# Patient Record
Sex: FEMALE | Race: WHITE | NOT HISPANIC OR LATINO | Employment: PART TIME | URBAN - NONMETROPOLITAN AREA
[De-identification: names, ages, dates, MRNs, and addresses within clinical notes are randomized per-mention and may not be internally consistent; named-entity substitution may affect disease eponyms.]

---
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
## 2020-08-28 ENCOUNTER — OFFICE VISIT (OUTPATIENT)
Dept: FAMILY MEDICINE CLINIC | Facility: CLINIC | Age: 20
End: 2020-08-28

## 2020-08-28 ENCOUNTER — LAB (OUTPATIENT)
Dept: LAB | Facility: HOSPITAL | Age: 20
End: 2020-08-28

## 2020-08-28 VITALS
DIASTOLIC BLOOD PRESSURE: 72 MMHG | WEIGHT: 137.6 LBS | SYSTOLIC BLOOD PRESSURE: 120 MMHG | HEIGHT: 64 IN | BODY MASS INDEX: 23.49 KG/M2 | OXYGEN SATURATION: 97 % | HEART RATE: 106 BPM

## 2020-08-28 DIAGNOSIS — Z13.29 SCREENING FOR THYROID DISORDER: ICD-10-CM

## 2020-08-28 DIAGNOSIS — Z76.89 ENCOUNTER TO ESTABLISH CARE: Primary | ICD-10-CM

## 2020-08-28 DIAGNOSIS — E55.9 VITAMIN D DEFICIENCY: ICD-10-CM

## 2020-08-28 DIAGNOSIS — Z13.1 SCREENING FOR DIABETES MELLITUS: ICD-10-CM

## 2020-08-28 DIAGNOSIS — F41.9 ANXIETY: ICD-10-CM

## 2020-08-28 DIAGNOSIS — Z11.59 ENCOUNTER FOR HEPATITIS C SCREENING TEST FOR LOW RISK PATIENT: ICD-10-CM

## 2020-08-28 DIAGNOSIS — F33.1 MODERATE EPISODE OF RECURRENT MAJOR DEPRESSIVE DISORDER (HCC): ICD-10-CM

## 2020-08-28 DIAGNOSIS — Z13.0 SCREENING FOR DEFICIENCY ANEMIA: ICD-10-CM

## 2020-08-28 LAB
ALBUMIN SERPL-MCNC: 4.6 G/DL (ref 3.5–5.2)
ALBUMIN/GLOB SERPL: 1.6 G/DL
ALP SERPL-CCNC: 40 U/L (ref 39–117)
ALT SERPL W P-5'-P-CCNC: 21 U/L (ref 1–33)
ANION GAP SERPL CALCULATED.3IONS-SCNC: 11.1 MMOL/L (ref 5–15)
AST SERPL-CCNC: 17 U/L (ref 1–32)
BASOPHILS # BLD AUTO: 0.02 10*3/MM3 (ref 0–0.2)
BASOPHILS NFR BLD AUTO: 0.4 % (ref 0–1.5)
BILIRUB SERPL-MCNC: 0.3 MG/DL (ref 0–1.2)
BUN SERPL-MCNC: 6 MG/DL (ref 6–20)
BUN/CREAT SERPL: 6.7 (ref 7–25)
CALCIUM SPEC-SCNC: 9.8 MG/DL (ref 8.6–10.5)
CHLORIDE SERPL-SCNC: 104 MMOL/L (ref 98–107)
CO2 SERPL-SCNC: 22.9 MMOL/L (ref 22–29)
CREAT SERPL-MCNC: 0.89 MG/DL (ref 0.57–1)
DEPRECATED RDW RBC AUTO: 38.7 FL (ref 37–54)
EOSINOPHIL # BLD AUTO: 0.04 10*3/MM3 (ref 0–0.4)
EOSINOPHIL NFR BLD AUTO: 0.7 % (ref 0.3–6.2)
ERYTHROCYTE [DISTWIDTH] IN BLOOD BY AUTOMATED COUNT: 12.7 % (ref 12.3–15.4)
GFR SERPL CREATININE-BSD FRML MDRD: 81 ML/MIN/1.73
GLOBULIN UR ELPH-MCNC: 2.8 GM/DL
GLUCOSE SERPL-MCNC: 80 MG/DL (ref 65–99)
HCT VFR BLD AUTO: 42.9 % (ref 34–46.6)
HGB BLD-MCNC: 14.4 G/DL (ref 12–15.9)
IMM GRANULOCYTES # BLD AUTO: 0.02 10*3/MM3 (ref 0–0.05)
IMM GRANULOCYTES NFR BLD AUTO: 0.4 % (ref 0–0.5)
LYMPHOCYTES # BLD AUTO: 1.73 10*3/MM3 (ref 0.7–3.1)
LYMPHOCYTES NFR BLD AUTO: 30.5 % (ref 19.6–45.3)
MCH RBC QN AUTO: 28.1 PG (ref 26.6–33)
MCHC RBC AUTO-ENTMCNC: 33.6 G/DL (ref 31.5–35.7)
MCV RBC AUTO: 83.8 FL (ref 79–97)
MONOCYTES # BLD AUTO: 0.37 10*3/MM3 (ref 0.1–0.9)
MONOCYTES NFR BLD AUTO: 6.5 % (ref 5–12)
NEUTROPHILS NFR BLD AUTO: 3.5 10*3/MM3 (ref 1.7–7)
NEUTROPHILS NFR BLD AUTO: 61.5 % (ref 42.7–76)
NRBC BLD AUTO-RTO: 0 /100 WBC (ref 0–0.2)
PLATELET # BLD AUTO: 290 10*3/MM3 (ref 140–450)
PMV BLD AUTO: 10.1 FL (ref 6–12)
POTASSIUM SERPL-SCNC: 3.8 MMOL/L (ref 3.5–5.2)
PROT SERPL-MCNC: 7.4 G/DL (ref 6–8.5)
RBC # BLD AUTO: 5.12 10*6/MM3 (ref 3.77–5.28)
SODIUM SERPL-SCNC: 138 MMOL/L (ref 136–145)
TSH SERPL DL<=0.05 MIU/L-ACNC: 1.28 UIU/ML (ref 0.27–4.2)
WBC # BLD AUTO: 5.68 10*3/MM3 (ref 3.4–10.8)

## 2020-08-28 PROCEDURE — 80053 COMPREHEN METABOLIC PANEL: CPT

## 2020-08-28 PROCEDURE — 99203 OFFICE O/P NEW LOW 30 MIN: CPT | Performed by: PHYSICIAN ASSISTANT

## 2020-08-28 PROCEDURE — 85025 COMPLETE CBC W/AUTO DIFF WBC: CPT

## 2020-08-28 PROCEDURE — 84443 ASSAY THYROID STIM HORMONE: CPT

## 2020-08-28 PROCEDURE — 36415 COLL VENOUS BLD VENIPUNCTURE: CPT

## 2020-08-28 PROCEDURE — 86803 HEPATITIS C AB TEST: CPT

## 2020-08-28 PROCEDURE — 82306 VITAMIN D 25 HYDROXY: CPT

## 2020-08-28 RX ORDER — DROSPIRENONE AND ETHINYL ESTRADIOL 0.02-3(28)
1 KIT ORAL DAILY
COMMUNITY
End: 2023-03-14

## 2020-08-28 RX ORDER — BUPROPION HYDROCHLORIDE 150 MG/1
150 TABLET ORAL EVERY MORNING
Qty: 30 TABLET | Refills: 1 | Status: SHIPPED | OUTPATIENT
Start: 2020-08-28 | End: 2023-03-14

## 2020-08-28 NOTE — PROGRESS NOTES
Chief Complaint   Patient presents with   • Establish Care   • Anxiety     was on medicine for about 4 years, came off medications about 8 months ago. States that it was okay for a couple of months, and now having more issues. Stress and anxiety has worsened a couple of months ago and started having pain. Pt states that she has lost some weight as well. Pt reports that she formerly smoked marijuana and recently stopped doing that as well   • Abdominal Pain     been going on for a couple of months       HPI     Nataliia Rick is a 20 y.o. female who presents to establish care.  Patient has recently been seen by the [facility] in [place].  She reports that this office is closer and prefers to transfer her care here.  She has a long history of depression and anxiety starting in her teens.  She started medication when she was 14 years old and recently discontinued it 8 months ago.  She wanted to see if she really needed the medication.  She reports that she has had worsening depression and anxiety since discontinuing the medication.  She has noticed pain throughout her body as well including her abdominal area.  She was smoking marijuana to help with her anxiety but discontinued this a few weeks ago as it started to make her symptoms worse.  She has noticed weight loss since discontinuing the marijuana.  She has tried Zoloft but did not tolerate it.  Her main complaints of side effects are weight gain and and anorgasmia.  Cymbalta is listed as a medication but patient does not remember ever taking this.  She was given hydroxyzine and she does not feel that this was helpful.  She has never taken bupropion.  She is currently on this and Spironolactone 75 mg daily for acne which her dermatologist prescribes.  She is open to seeing psychiatry.    Chief Complaint   Patient presents with   • Establish Care   • Anxiety     was on medicine for about 4 years, came off medications about 8 months ago. States that it was okay for a  "couple of months, and now having more issues. Stress and anxiety has worsened a couple of months ago and started having pain. Pt states that she has lost some weight as well. Pt reports that she formerly smoked marijuana and recently stopped doing that as well   • Abdominal Pain     been going on for a couple of months       Past Medical History:   Diagnosis Date   • Anxiety    • Depression        Past Surgical History:   Procedure Laterality Date   • ARM LACERATION REPAIR     • TONSILLECTOMY         Family History   Problem Relation Age of Onset   • Diabetes Mother    • Depression Mother        Social History     Socioeconomic History   • Marital status: Unknown     Spouse name: Not on file   • Number of children: Not on file   • Years of education: Not on file   • Highest education level: Not on file   Tobacco Use   • Smoking status: Never Smoker   • Smokeless tobacco: Never Used   Substance and Sexual Activity   • Alcohol use: No     Frequency: Never   • Drug use: Yes     Types: Marijuana     Comment: stopped using   • Sexual activity: Defer       Allergies   Allergen Reactions   • Sulfa Antibiotics Itching       ROS    Review of Systems   Constitutional: Positive for fatigue and unexpected weight loss. Negative for chills, diaphoresis and fever.   HENT: Negative for congestion, postnasal drip and rhinorrhea.    Respiratory: Negative for cough, shortness of breath and wheezing.    Cardiovascular: Negative for chest pain and leg swelling.   Musculoskeletal: Positive for arthralgias and myalgias.   Neurological: Negative for dizziness and headache.   Psychiatric/Behavioral: Positive for agitation, sleep disturbance, depressed mood and stress. Negative for self-injury and suicidal ideas. The patient is nervous/anxious.        Vitals:    08/28/20 1207   BP: 120/72   BP Location: Left arm   Patient Position: Sitting   Cuff Size: Adult   Pulse: 106   SpO2: 97%   Weight: 62.4 kg (137 lb 9.6 oz)   Height: 162.6 cm (64\") "     Body mass index is 23.62 kg/m².    Current Outpatient Medications on File Prior to Visit   Medication Sig Dispense Refill   • drospirenone-ethinyl estradiol (KATHY,GIANVI) 3-0.02 MG per tablet Take 1 tablet by mouth Daily.     • spironolactone (ALDACTONE) 25 MG tablet Take 25 mg by mouth Daily. She reports that she takes 3 25 mg tablets daily     • [DISCONTINUED] dicyclomine (BENTYL) 10 MG capsule Take 1 capsule by mouth 3 (Three) Times a Day As Needed (abdominal discomfort). 40 capsule 0   • [DISCONTINUED] DULoxetine HCl (CYMBALTA PO) Take  by mouth.     • [DISCONTINUED] hydrOXYzine (ATARAX) 10 MG tablet Take 10 mg by mouth 3 (Three) Times a Day As Needed for Itching.       No current facility-administered medications on file prior to visit.        No results found for this or any previous visit.    PE  Physical Exam   Constitutional: Vital signs are normal. She appears well-developed and well-nourished. She is active and cooperative. She does not appear ill. No distress. She appears overweight. She is not obese.  HENT:   Head: Normocephalic and atraumatic.   Eyes: EOM are normal.   Neck: Normal range of motion.   Cardiovascular: Normal rate, regular rhythm and normal heart sounds.   Pulmonary/Chest: Effort normal and breath sounds normal.   Musculoskeletal: Normal range of motion. She exhibits no edema.   Neurological: She is alert.   Skin: Skin is warm. She is not diaphoretic. No erythema.   Psychiatric: Her speech is normal. Judgment and thought content normal. Her mood appears anxious. She is agitated. She is not actively hallucinating. Cognition and memory are normal. She exhibits a depressed mood.   Tearful during appointment. She is attentive.   Vitals reviewed.      A/P    Nataliia was seen today for establish care, anxiety and abdominal pain.    Diagnoses and all orders for this visit:    Moderate episode of recurrent major depressive disorder (CMS/HCC)  -     Ambulatory Referral to Psychiatry  -      buPROPion XL (Wellbutrin XL) 150 MG 24 hr tablet; Take 1 tablet by mouth Every Morning.    Anxiety  -     Ambulatory Referral to Psychiatry  -     buPROPion XL (Wellbutrin XL) 150 MG 24 hr tablet; Take 1 tablet by mouth Every Morning.    Vitamin D deficiency  -     Vitamin D 25 Hydroxy; Future    Screening for deficiency anemia  -     CBC Auto Differential; Future    Screening for thyroid disorder  -     TSH Rfx On Abnormal To Free T4; Future    Screening for diabetes mellitus  -     Comprehensive Metabolic Panel; Future    Encounter for hepatitis C screening test for low risk patient  -     Hepatitis C Antibody; Future         Plan of care reviewed with patient at the conclusion of today's visit. Education was provided regarding diagnosis, management and any prescribed or recommended OTC medications.  Patient verbalizes understanding of and agreement with management plan.    Return in about 4 weeks (around 9/25/2020) for Annual physical.     Adeola Melton PA-C

## 2020-08-28 NOTE — PATIENT INSTRUCTIONS
Regular sleep schedule, 10 or 11 pm to 7 or 8 am.  Meditation - Headspace.  Breathing techniques on youtube.  Yoga and exercise (walking), 30 minutes a day.                Living With Depression  Everyone experiences occasional disappointment, sadness, and loss in their lives. When you are feeling down, blue, or sad for at least 2 weeks in a row, it may mean that you have depression. Depression can affect your thoughts and feelings, relationships, daily activities, and physical health. It is caused by changes in the way your brain functions. If you receive a diagnosis of depression, your health care provider will tell you which type of depression you have and what treatment options are available to you.  If you are living with depression, there are ways to help you recover from it and also ways to prevent it from coming back.  How to cope with lifestyle changes  Coping with stress         Stress is your body’s reaction to life changes and events, both good and bad. Stressful situations may include:  · Getting .  · The death of a spouse.  · Losing a job.  · Retiring.  · Having a baby.  Stress can last just a few hours or it can be ongoing. Stress can play a major role in depression, so it is important to learn both how to cope with stress and how to think about it differently.  Talk with your health care provider or a counselor if you would like to learn more about stress reduction. He or she may suggest some stress reduction techniques, such as:  · Music therapy. This can include creating music or listening to music. Choose music that you enjoy and that inspires you.  · Mindfulness-based meditation. This kind of meditation can be done while sitting or walking. It involves being aware of your normal breaths, rather than trying to control your breathing.  · Centering prayer. This is a kind of meditation that involves focusing on a spiritual word or phrase. Choose a word, phrase, or sacred image that is  meaningful to you and that brings you peace.  · Deep breathing. To do this, expand your stomach and inhale slowly through your nose. Hold your breath for 3-5 seconds, then exhale slowly, allowing your stomach muscles to relax.  · Muscle relaxation. This involves intentionally tensing muscles then relaxing them.  Choose a stress reduction technique that fits your lifestyle and personality. Stress reduction techniques take time and practice to develop. Set aside 5-15 minutes a day to do them. Therapists can offer training in these techniques. The training may be covered by some insurance plans. Other things you can do to manage stress include:  · Keeping a stress diary. This can help you learn what triggers your stress and ways to control your response.  · Understanding what your limits are and saying no to requests or events that lead to a schedule that is too full.  · Thinking about how you respond to certain situations. You may not be able to control everything, but you can control how you react.  · Adding humor to your life by watching funny films or TV shows.  · Making time for activities that help you relax and not feeling guilty about spending your time this way.    Medicines  Your health care provider may suggest certain medicines if he or she feels that they will help improve your condition. Avoid using alcohol and other substances that may prevent your medicines from working properly (may interact). It is also important to:  · Talk with your pharmacist or health care provider about all the medicines that you take, their possible side effects, and what medicines are safe to take together.  · Make it your goal to take part in all treatment decisions (shared decision-making). This includes giving input on the side effects of medicines. It is best if shared decision-making with your health care provider is part of your total treatment plan.  If your health care provider prescribes a medicine, you may not notice  the full benefits of it for 4-8 weeks. Most people who are treated for depression need to be on medicine for at least 6-12 months after they feel better. If you are taking medicines as part of your treatment, do not stop taking medicines without first talking to your health care provider. You may need to have the medicine slowly decreased (tapered) over time to decrease the risk of harmful side effects.  Relationships  Your health care provider may suggest family therapy along with individual therapy and drug therapy. While there may not be family problems that are causing you to feel depressed, it is still important to make sure your family learns as much as they can about your mental health. Having your family’s support can help make your treatment successful.  How to recognize changes in your condition  Everyone has a different response to treatment for depression. Recovery from major depression happens when you have not had signs of major depression for two months. This may mean that you will start to:  · Have more interest in doing activities.  · Feel less hopeless than you did 2 months ago.  · Have more energy.  · Overeat less often, or have better or improving appetite.  · Have better concentration.  Your health care provider will work with you to decide the next steps in your recovery. It is also important to recognize when your condition is getting worse. Watch for these signs:  · Having fatigue or low energy.  · Eating too much or too little.  · Sleeping too much or too little.  · Feeling restless, agitated, or hopeless.  · Having trouble concentrating or making decisions.  · Having unexplained physical complaints.  · Feeling irritable, angry, or aggressive.  Get help as soon as you or your family members notice these symptoms coming back.  How to get support and help from others  How to talk with friends and family members about your condition    Talking to friends and family members about your condition  can provide you with one way to get support and guidance. Reach out to trusted friends or family members, explain your symptoms to them, and let them know that you are working with a health care provider to treat your depression.  Financial resources  Not all insurance plans cover mental health care, so it is important to check with your insurance carrier. If paying for co-pays or counseling services is a problem, search for a local or [facility] mental health care center. They may be able to offer public mental health care services at low or no cost when you are not able to see a private health care provider.  If you are taking medicine for depression, you may be able to get the generic form, which may be less expensive. Some makers of prescription medicines also offer help to patients who cannot afford the medicines they need.  Follow these instructions at home:    · Get the right amount and quality of sleep.  · Cut down on using caffeine, tobacco, alcohol, and other potentially harmful substances.  · Try to exercise, such as walking or lifting small weights.  · Take over-the-counter and prescription medicines only as told by your health care provider.  · Eat a healthy diet that includes plenty of vegetables, fruits, whole grains, low-fat dairy products, and lean protein. Do not eat a lot of foods that are high in solid fats, added sugars, or salt.  · Keep all follow-up visits as told by your health care provider. This is important.  Contact a health care provider if:  · You stop taking your antidepressant medicines, and you have any of these symptoms:  ? Nausea.  ? Headache.  ? Feeling lightheaded.  ? Chills and body aches.  ? Not being able to sleep (insomnia).  · You or your friends and family think your depression is getting worse.  Get help right away if:  · You have thoughts of hurting yourself or others.  If you ever feel like you may hurt yourself or others, or have thoughts about taking your own life, get  help right away. You can go to your nearest emergency department or call:  · Your local emergency services (911 in the [place]  · A suicide crisis helpline, such as the National Suicide Prevention Lifeline at 1-479.911.7114. This is open 24-hours a day.  Summary  · If you are living with depression, there are ways to help you recover from it and also ways to prevent it from coming back.  · Work with your health care team to create a management plan that includes counseling, stress management techniques, and healthy lifestyle habits.  This information is not intended to replace advice given to you by your health care provider. Make sure you discuss any questions you have with your health care provider.  Document Released: 11/20/2017 Document Revised: 04/10/2020 Document Reviewed: 11/20/2017  Elsevier Patient Education © 2020 [employer]

## 2020-08-29 LAB
25(OH)D3 SERPL-MCNC: 44.4 NG/ML (ref 30–100)
HCV AB SER DONR QL: NORMAL

## 2020-09-01 ENCOUNTER — TELEPHONE (OUTPATIENT)
Dept: FAMILY MEDICINE CLINIC | Facility: CLINIC | Age: 20
End: 2020-09-01

## 2020-09-01 NOTE — TELEPHONE ENCOUNTER
Patient states that she had labs done last week and was wanting to get the results.  She can be reached at [phone]

## 2020-09-21 ENCOUNTER — OFFICE VISIT (OUTPATIENT)
Dept: FAMILY MEDICINE CLINIC | Facility: CLINIC | Age: 20
End: 2020-09-21

## 2020-09-21 VITALS
BODY MASS INDEX: 23.9 KG/M2 | HEART RATE: 80 BPM | DIASTOLIC BLOOD PRESSURE: 74 MMHG | HEIGHT: 64 IN | SYSTOLIC BLOOD PRESSURE: 106 MMHG | OXYGEN SATURATION: 98 % | WEIGHT: 140 LBS

## 2020-09-21 DIAGNOSIS — L71.0 PERIORAL DERMATITIS: Primary | ICD-10-CM

## 2020-09-21 PROCEDURE — 99213 OFFICE O/P EST LOW 20 MIN: CPT | Performed by: PHYSICIAN ASSISTANT

## 2020-09-21 RX ORDER — SPIRONOLACTONE 50 MG/1
TABLET, FILM COATED ORAL
COMMUNITY
End: 2023-03-14

## 2020-09-21 NOTE — PROGRESS NOTES
"    Chief Complaint   Patient presents with   • Rash     rash around corners on mouth noticed yesterday. did last month had some oral thursh at one point       HPI     Nataliia Rick is a pleasant 20 y.o. female who presents for evaluation of \"chief complaint.\" She c/o of new onset rash at the corners of her mouth for 1 day. It has improved some today. She thinks there is only some slight dryness now.     She was treated for thrush last month and wants to make sure it is gone. She denies current symptoms.     Past Medical History:   Diagnosis Date   • Anxiety    • Depression        Past Surgical History:   Procedure Laterality Date   • ARM LACERATION REPAIR     • TONSILLECTOMY         Family History   Problem Relation Age of Onset   • Diabetes Mother    • Depression Mother        Social History     Socioeconomic History   • Marital status: Unknown     Spouse name: Not on file   • Number of children: Not on file   • Years of education: Not on file   • Highest education level: Not on file   Tobacco Use   • Smoking status: Never Smoker   • Smokeless tobacco: Never Used   Substance and Sexual Activity   • Alcohol use: No     Frequency: Never   • Drug use: Yes     Types: Marijuana     Comment: stopped using   • Sexual activity: Defer       Allergies   Allergen Reactions   • Sulfa Antibiotics Itching   • Benzonatate Rash       ROS    Review of Systems   Skin: Positive for rash.       Vitals:    09/21/20 1229   BP: 106/74   Pulse: 80   SpO2: 98%     Body mass index is 24.03 kg/m².      Current Outpatient Medications:   •  drospirenone-ethinyl estradiol (KATHY,GIANVI) 3-0.02 MG per tablet, Take 1 tablet by mouth Daily., Disp: , Rfl:   •  spironolactone (ALDACTONE) 25 MG tablet, Take 25 mg by mouth Daily. She reports that she takes 3 25 mg tablets daily, Disp: , Rfl:   •  buPROPion XL (Wellbutrin XL) 150 MG 24 hr tablet, Take 1 tablet by mouth Every Morning., Disp: 30 tablet, Rfl: 1  •  spironolactone (ALDACTONE) 50 MG tablet, " spironolactone 50 mg tablet, Disp: , Rfl:     PE    Physical Exam  Constitutional:       General: She is not in acute distress.  HENT:      Mouth/Throat:      Comments: Geographic tongue. No residual thrush  Pulmonary:      Effort: Pulmonary effort is normal. No respiratory distress.   Skin:     Comments: No perioral rash present on exam today   Neurological:      Mental Status: She is alert.   Psychiatric:         Mood and Affect: Mood normal.          A/P    Problem List Items Addressed This Visit     None      Visit Diagnoses     Perioral dermatitis    -  Primary  -None by exam, improving per patient  -Discussed monitoring since I do not appreciate any dry skin or other abnormalities on her exam. If the rash returns, we could try topical mupirocin. Encouraged patient to call or return if needed          Plan of care was reviewed with patient at the conclusion of today's visit. Education was provided regarding diagnoses, management, prescribed or recommended OTC products, and the importance of compliance with follow-up appointments. The patient was counseled regarding the risks, benefits, and possible side-effects of treatment. I advised the patient to keep me informed of any acute changes in their status including new, worsening, or persistent symptoms. Patient expresses understanding and agreement with the management plan.        KATTY Gonzalez

## 2020-09-21 NOTE — PROGRESS NOTES
I have reviewed the notes, assessments, and/or procedures performed by KATTY Ngo, I concur with her/his documentation of Nataliia Rick.

## 2021-05-28 ENCOUNTER — HOSPITAL ENCOUNTER (EMERGENCY)
Facility: HOSPITAL | Age: 21
Discharge: HOME | End: 2021-05-28
Attending: EMERGENCY MEDICINE | Admitting: EMERGENCY MEDICINE

## 2021-05-28 VITALS
TEMPERATURE: 97.7 F | DIASTOLIC BLOOD PRESSURE: 76 MMHG | SYSTOLIC BLOOD PRESSURE: 116 MMHG | HEART RATE: 97 BPM | RESPIRATION RATE: 18 BRPM | OXYGEN SATURATION: 98 % | WEIGHT: 155 LBS | HEIGHT: 64 IN | BODY MASS INDEX: 26.46 KG/M2

## 2021-05-28 DIAGNOSIS — Z91.89 AT RISK FOR SEXUALLY TRANSMITTED DISEASE DUE TO PARTNER WITH MULTIPLE PARTNERS: ICD-10-CM

## 2021-05-28 DIAGNOSIS — Z72.51 UNPROTECTED SEXUAL INTERCOURSE: Primary | ICD-10-CM

## 2021-05-28 DIAGNOSIS — F41.9 ANXIETY: ICD-10-CM

## 2021-05-28 PROCEDURE — 99282 EMERGENCY DEPT VISIT SF MDM: CPT

## 2021-06-23 ENCOUNTER — LAB (OUTPATIENT)
Dept: LAB | Facility: HOSPITAL | Age: 21
End: 2021-06-23

## 2021-06-23 ENCOUNTER — TRANSCRIBE ORDERS (OUTPATIENT)
Dept: LAB | Facility: HOSPITAL | Age: 21
End: 2021-06-23

## 2021-06-23 DIAGNOSIS — Z11.3 SCREENING EXAMINATION FOR VENEREAL DISEASE: ICD-10-CM

## 2021-06-23 DIAGNOSIS — Z11.3 SCREENING EXAMINATION FOR VENEREAL DISEASE: Primary | ICD-10-CM

## 2021-06-23 PROCEDURE — 87340 HEPATITIS B SURFACE AG IA: CPT

## 2021-06-23 PROCEDURE — 86592 SYPHILIS TEST NON-TREP QUAL: CPT

## 2021-06-23 PROCEDURE — G0432 EIA HIV-1/HIV-2 SCREEN: HCPCS

## 2021-06-23 PROCEDURE — 86803 HEPATITIS C AB TEST: CPT

## 2021-06-23 PROCEDURE — 36415 COLL VENOUS BLD VENIPUNCTURE: CPT

## 2021-06-24 LAB
HBV SURFACE AG SERPL QL IA: NORMAL
HCV AB SER DONR QL: NORMAL
HIV1+2 AB SER QL: NORMAL
RPR SER QL: NORMAL

## 2023-03-14 ENCOUNTER — OFFICE VISIT (OUTPATIENT)
Dept: OBSTETRICS AND GYNECOLOGY | Facility: CLINIC | Age: 23
End: 2023-03-14
Payer: COMMERCIAL

## 2023-03-14 VITALS
HEIGHT: 64 IN | BODY MASS INDEX: 19.7 KG/M2 | SYSTOLIC BLOOD PRESSURE: 98 MMHG | DIASTOLIC BLOOD PRESSURE: 66 MMHG | WEIGHT: 115.4 LBS

## 2023-03-14 DIAGNOSIS — N76.0 BV (BACTERIAL VAGINOSIS): ICD-10-CM

## 2023-03-14 DIAGNOSIS — B96.89 BV (BACTERIAL VAGINOSIS): ICD-10-CM

## 2023-03-14 DIAGNOSIS — Z11.3 SCREEN FOR STD (SEXUALLY TRANSMITTED DISEASE): Primary | ICD-10-CM

## 2023-03-14 DIAGNOSIS — N89.8 VAGINAL DISCHARGE: ICD-10-CM

## 2023-03-14 PROCEDURE — 99203 OFFICE O/P NEW LOW 30 MIN: CPT | Performed by: OBSTETRICS & GYNECOLOGY

## 2023-03-14 RX ORDER — METRONIDAZOLE 7.5 MG/G
GEL VAGINAL 2 TIMES DAILY
Qty: 70 G | Refills: 0 | Status: SHIPPED | OUTPATIENT
Start: 2023-03-14 | End: 2023-03-19

## 2023-03-14 NOTE — PROGRESS NOTES
Chief Complaint  [facility] (Patient is here with reoccurring BV. She was recently treated for it and its come back. Had a pap smear a year ago at [facility]- neg results. )    Subjective        Nataliia Rick presents to [facility]  History of Present Illness  Patient is here for evaluation of vaginal discharge.  She reports a white malodorous discharge.  She says she noticed it about 3 weeks ago.  She actually went for treatment at [facility] and they prescribed her metronidazole gel.  Patient states that she does not tolerate the metronidazole pills well so request the gel.  She said that she was on her period at the time that she use the gel so she thinks it may not have been fully efficacious.  She is still having symptoms at this time.  Patient reports a history of BV in the past.  She says it was more common when she was sexually active.  She has not been sexually active for about the last 4 months and she does not think she has had a previous BV episode for at least the last 4 months.  She reports that she uses probiotics intermittently.  Denies the use of harsh soaps or chemicals.  Patient refuses STD testing today because she says she just recently had a done 3 weeks ago at [facility].  She does agree with testing for mycoplasma in addition to BV/yeast.  Patient reports that she has had a Pap smear within the last year that was normal.    Menstrual History:  OB History        0    Para   0    Term   0       0    AB   0    Living   0       SAB   0    IAB   0    Ectopic   0    Molar   0    Multiple   0    Live Births   0                 Patient's last menstrual period was 2023 (exact date).     Past Medical History:   Diagnosis Date   • Anxiety    • Chlamydia 2019   • Depression    • Gonorrhea Unsure   • Urogenital trichomoniasis Unsure   • Varicella Unsure       Past Surgical History:   Procedure Laterality Date   • ARM LACERATION  REPAIR     • TONSILLECTOMY     • WISDOM TOOTH EXTRACTION         Social History     Tobacco Use   • Smoking status: Every Day     Types: Electronic Cigarette   • Smokeless tobacco: Never   Vaping Use   • Vaping Use: Some days   Substance Use Topics   • Alcohol use: Yes     Comment: Depends on the week   • Drug use: Yes     Types: Cocaine(coke), Marijuana     Comment: stopped using       Family History   Problem Relation Age of Onset   • Diabetes Mother    • Depression Mother    • Breast cancer Maternal Grandmother        Current Outpatient Medications on File Prior to Visit   Medication Sig   • [DISCONTINUED] buPROPion XL (Wellbutrin XL) 150 MG 24 hr tablet Take 1 tablet by mouth Every Morning.   • [DISCONTINUED] drospirenone-ethinyl estradiol (KATHY,GIANVI) 3-0.02 MG per tablet Take 1 tablet by mouth Daily.   • [DISCONTINUED] spironolactone (ALDACTONE) 25 MG tablet Take 25 mg by mouth Daily. She reports that she takes 3 25 mg tablets daily   • [DISCONTINUED] spironolactone (ALDACTONE) 50 MG tablet spironolactone 50 mg tablet     No current facility-administered medications on file prior to visit.       Allergies   Allergen Reactions   • Sulfa Antibiotics Itching   • Benzonatate Rash       ROS:  Constitutional: No fevers, chills, sweats   Eye: No recent visual problems, denies blurry vision   HEENT: No ear pain, nasal congestion, sore throat, voice changes   Respiratory: No shortness of breath, cough, pain on breathing   Cardiovascular: No Chest pain, palpitations   Gastrointestinal: No nausea, vomiting, diarrhea, constipation   Genitourinary: No hematuria, dysuria, lesions on genitalia   Hema/Lymph: Negative for bruising, no edema   Endocrine: Negative for excessive thirst, excessive hunger, heat or cold intolerance   Musculoskeletal: No joint pain, muscle pain, decreased range of motion   Integumentary: No rash, pruritus, abrasions, lesions   Neurologic: No weakness, numbness, headaches   Psychiatric: No anxiety,  "depression, mood changes           Objective   Vital Signs:  BP 98/66   Ht 162.6 cm (64\")   Wt 52.3 kg (115 lb 6.4 oz)   BMI 19.81 kg/m²   Estimated body mass index is 19.81 kg/m² as calculated from the following:    Height as of this encounter: 162.6 cm (64\").    Weight as of this encounter: 52.3 kg (115 lb 6.4 oz).       BMI is within normal parameters. No other follow-up for BMI required.      Physical Exam   Gen: No acute distress, awake and oriented times three  Abdomen: soft, nontender, no masses or hernia, no hepatosplenomegaly, non distended, normoactive bowel sounds  Pelvic: Exam performed in the presence of a female chaperone  Patient has provided verbal consent to proceed with exam.  Normal external female genitalia, no lesions  Urethra: Normal meatus, no caruncle  Bladder: nontender  Vagina: No blood; mild amount of thin white discharge  Cervix: No cervical motion tenderness, no lesions, no active bleeding, nonfriable  Uterus: Anteverted, normal size and shape, nontender  Adnexa: No masses or tenderness  External anal exam: Normal appearance, no lesions or hemorrhoids  Rectal: Deferred  Psych: Good judgement and insight, normal affect and mood      Result Review :                   Assessment and Plan   Diagnoses and all orders for this visit:    1. Screen for STD (sexually transmitted disease) (Primary)  -     Cancel: Mycoplasma genitalium, HEIDY, Swab - Swab, Cervix  -     Cancel: Mycoplasma genitalium, HEIDY, Swab - Swab, Vagina  -     Cancel: NuSwab BV & Candida - Swab, Vagina  -     Mycoplasma genitalium, HEIDY, Swab - Swab, Vagina    2. Vaginal discharge  -     Cancel: NuSwab VG+ - Swab, Vagina  -     Cancel: NuSwab BV & Candida - Swab, Vagina  -     Cancel: NuSwab BV & Candida - Swab, Vagina  -     Cancel: NuSwab BV & Candida - Swab, Vagina  -     NuSwab BV & Candida - Swab, Vagina    3. BV (bacterial vaginosis)  -     metroNIDAZOLE (METROGEL) 0.75 % vaginal gel; Insert  into the vagina 2 (Two) Times " a Day for 5 days.  Dispense: 70 g; Refill: 0  -     Cancel: NuSwab BV & Candida - Swab, Vagina  -     Mycoplasma genitalium, HEIDY, Swab - Swab, Vagina    Symptoms and exam findings c/w bacterial vaginosis. Will treat empirically with metronidazole. Also recommend probiotics. Avoid vaginal/vulvar irritants. Will send cultures for confirmation. Followup as needed.  Being that the patient has had recurrent symptoms of BV, I would recommend testing for mycoplasma and Ureaplasma today.  Patient agrees with this plan.  She declines STD testing today because she says it was recently done 3 weeks ago at [facility].  If patient continues to have recurrent BV, would also consider antibiotic suppression with weekly metronidazole gel.         Follow Up   No follow-ups on file.  Patient was given instructions and counseling regarding her condition or for health maintenance advice. Please see specific information pulled into the AVS if appropriate.

## 2023-03-16 ENCOUNTER — PATIENT MESSAGE (OUTPATIENT)
Dept: OBSTETRICS AND GYNECOLOGY | Facility: CLINIC | Age: 23
End: 2023-03-16
Payer: COMMERCIAL

## 2023-03-16 ENCOUNTER — PATIENT ROUNDING (BHMG ONLY) (OUTPATIENT)
Dept: OBSTETRICS AND GYNECOLOGY | Facility: CLINIC | Age: 23
End: 2023-03-16
Payer: COMMERCIAL

## 2023-03-16 NOTE — PROGRESS NOTES
My chart message has been sent to the patient for PATIENT ROUNDING with [facility].

## 2023-03-20 LAB
A VAGINAE DNA VAG QL NAA+PROBE: NORMAL SCORE
BVAB2 DNA VAG QL NAA+PROBE: NORMAL SCORE
C ALBICANS DNA VAG QL NAA+PROBE: NEGATIVE
C GLABRATA DNA VAG QL NAA+PROBE: NEGATIVE
MEGA1 DNA VAG QL NAA+PROBE: NORMAL SCORE

## 2023-03-21 LAB — M GENITALIUM DNA SPEC QL NAA+PROBE: NEGATIVE

## 2023-03-23 ENCOUNTER — TELEPHONE (OUTPATIENT)
Dept: OBSTETRICS AND GYNECOLOGY | Facility: CLINIC | Age: 23
End: 2023-03-23
Payer: COMMERCIAL

## 2023-03-23 NOTE — TELEPHONE ENCOUNTER
----- Message from Eduar Guillen MD sent at 3/22/2023  4:24 PM EDT -----  Notify the patient that her vaginal cultures were negative.

## 2024-02-13 ENCOUNTER — OFFICE VISIT (OUTPATIENT)
Dept: FAMILY MEDICINE CLINIC | Facility: CLINIC | Age: 24
End: 2024-02-13
Payer: COMMERCIAL

## 2024-02-13 VITALS
WEIGHT: 146 LBS | HEIGHT: 64 IN | HEART RATE: 82 BPM | OXYGEN SATURATION: 98 % | DIASTOLIC BLOOD PRESSURE: 66 MMHG | TEMPERATURE: 98.5 F | BODY MASS INDEX: 24.92 KG/M2 | SYSTOLIC BLOOD PRESSURE: 98 MMHG

## 2024-02-13 DIAGNOSIS — R31.0 GROSS HEMATURIA: Primary | ICD-10-CM

## 2024-02-13 DIAGNOSIS — N92.6 IRREGULAR MENSTRUAL CYCLE: ICD-10-CM

## 2024-02-13 PROCEDURE — 99213 OFFICE O/P EST LOW 20 MIN: CPT | Performed by: NURSE PRACTITIONER

## 2024-02-13 NOTE — PROGRESS NOTES
"Subjective          Nataliia Rick presents to [facility] for Blood in Urine   History of Present Illness    She is here with complaint of blood in urine and bleeding out of urethra and vagina about 1 week ago.  She reports bleeding lasted 1 week.   Today, she denies having blood in her urine and denies having her period today.  She denies burning with urination, frequency/urgency with urination.  Recommended urine culture and STD testing and she declined because her insurance will not paying for these tests.   She is sexually active and indicated she would go somewhere else for STD testing and urine culture.  Discussed referral to [facility] due to blood coming from Urethra and she agreed.    Irregular Menstrual Cycles - She reports having 2 menstrual cycles per month   During the 2 episodes of menstrual cycle per month, bleeding is pretty light.  She uses about 2-3 light/regular tampons per day and 1-2 per pads per day.  She thinks that Spironolactone is causing her to have 2 periods per month.  She stopped taking Spironolactone for about 6 months when she was on Actane and did not have 2 periods per month during that time. Recommended patient follow-up with her Gynocologist, Dr. Guadarrama, regarding having 2 menstrual cycles per month and she agreed.  Discussed collecting CBC to check blood levels and patient declined due to insurance not covering cost of lab.      Review of Systems       Objective   Vital Signs:   BP 98/66 (BP Location: Left arm, Patient Position: Sitting, Cuff Size: Adult)   Pulse 82   Temp 98.5 °F (36.9 °C) (Oral)   Ht 162.6 cm (64.02\")   Wt 66.2 kg (146 lb)   SpO2 98%   BMI 25.05 kg/m²         Physical Exam  Vitals and nursing note reviewed.   Constitutional:       General: She is not in acute distress.     Appearance: Normal appearance. She is not ill-appearing.   HENT:      Head: Normocephalic and atraumatic.   Eyes:      Conjunctiva/sclera: Conjunctivae " normal.   Cardiovascular:      Rate and Rhythm: Normal rate and regular rhythm.      Heart sounds: Normal heart sounds. No murmur heard.  Pulmonary:      Effort: Pulmonary effort is normal. No respiratory distress.      Breath sounds: Normal breath sounds. No wheezing.   Abdominal:      General: Bowel sounds are normal. There is no distension.      Palpations: Abdomen is soft. There is no mass.      Tenderness: There is no abdominal tenderness. There is no right CVA tenderness, left CVA tenderness, guarding or rebound.   Skin:     General: Skin is warm and dry.      Findings: No erythema.   Neurological:      Mental Status: She is alert.        Result Review :                 Assessment and Plan    Diagnoses and all orders for this visit:    1. Gross hematuria (Primary)  Comments:  C/O blood in urine & bleeding out of urethra and vagina about 1 week ago that lasted 1 week.  Prior U/A result [address]ul from [facility] on 2/6/24.  Orders:  -     Ambulatory Referral to Urology    2. Irregular menstrual cycle  Comments:  Recommended she follow-up with Gynocologist, Dr. Guadarrama, regarding irregular menstrual cycles and she agreed.  Decline CBC today.    Other orders  -     Cancel: Urine Culture - Urine, Urine, Clean Catch        Follow Up   Return if symptoms worsen or fail to improve.  Patient was given instructions and counseling regarding her condition or for health maintenance advice. Please see specific information pulled into the AVS if appropriate.

## 2024-02-19 ENCOUNTER — TELEPHONE (OUTPATIENT)
Dept: FAMILY MEDICINE CLINIC | Facility: CLINIC | Age: 24
End: 2024-02-19
Payer: COMMERCIAL

## 2024-06-11 PROBLEM — N28.89 LEFT RENAL MASS: Status: ACTIVE | Noted: 2024-06-11

## 2025-02-24 ENCOUNTER — OFFICE VISIT (OUTPATIENT)
Dept: FAMILY MEDICINE CLINIC | Facility: CLINIC | Age: 25
End: 2025-02-24
Payer: COMMERCIAL

## 2025-02-24 VITALS
SYSTOLIC BLOOD PRESSURE: 112 MMHG | BODY MASS INDEX: 26.36 KG/M2 | WEIGHT: 154.4 LBS | HEIGHT: 64 IN | HEART RATE: 86 BPM | OXYGEN SATURATION: 98 % | DIASTOLIC BLOOD PRESSURE: 58 MMHG

## 2025-02-24 DIAGNOSIS — Z72.0 VAPES NICOTINE CONTAINING SUBSTANCE: ICD-10-CM

## 2025-02-24 DIAGNOSIS — R94.31 ABNORMAL ECG: ICD-10-CM

## 2025-02-24 DIAGNOSIS — R10.13 EPIGASTRIC PAIN: ICD-10-CM

## 2025-02-24 DIAGNOSIS — R10.12 ACUTE LUQ PAIN: ICD-10-CM

## 2025-02-24 DIAGNOSIS — R07.89 INTERMITTENT LEFT-SIDED CHEST PAIN: Primary | ICD-10-CM

## 2025-02-24 PROCEDURE — 93000 ELECTROCARDIOGRAM COMPLETE: CPT | Performed by: NURSE PRACTITIONER

## 2025-02-24 PROCEDURE — 99214 OFFICE O/P EST MOD 30 MIN: CPT | Performed by: NURSE PRACTITIONER

## 2025-02-24 RX ORDER — TRIAMCINOLONE ACETONIDE 1 MG/G
1 CREAM TOPICAL 2 TIMES DAILY
COMMUNITY
Start: 2024-12-09

## 2025-02-24 RX ORDER — OMEPRAZOLE 20 MG/1
20 CAPSULE, DELAYED RELEASE ORAL
Qty: 60 CAPSULE | Refills: 0 | Status: SHIPPED | OUTPATIENT
Start: 2025-02-24

## 2025-02-24 RX ORDER — KETOCONAZOLE 20 MG/ML
SHAMPOO, SUSPENSION TOPICAL WEEKLY
COMMUNITY
Start: 2024-12-09

## 2025-02-24 NOTE — PROGRESS NOTES
Chief Complaint  Abdominal Pain (Pt states that the pain goes from her stomach to her chest and is mainly in the left side of abdomen )    Subjective        HPI   History of Present Illness      Nataliia Rick presents to [facility] for evaluation of abdominal pain and chest pain:    LUQ Abd Pain - She reports experiencing intermittent, sharp pain in the left upper quadrant of her abdomen, which she describes as stabbing in nature. The onset of this pain was approximately 3 to 4 days ago. She rates the pain as a 4 or 5 on a scale of 10, with one instance of more severe pain rated at 6 or 7. The pain appears to be exacerbated after meals, regardless of the type of food consumed. Despite the pain, she maintains her ability to eat. She does not experience any associated nausea, vomiting, fever, or chills. 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    Chest Pain - Concurrently, she has been experiencing chest pain, predominantly on the left side, which she describes as a stabbing sensation that lingers briefly before subsiding. She rates this pain as a 4 on a scale of 10. She does not report any associated chest tightness or heaviness. The onset of this chest pain coincided with the onset of her abdominal pain, approximately 3 to 4 days ago. She admits to vaping nicotine with flavoring and has attempted to quit but has been unsuccessful. She recalls an episode of severe pain following a morning vaping session, which prompted her to abstain from vaping for the remainder of the day, resulting in a decrease in pain intensity. She does not smoke cigarettes.    Vape Use - She reports using vape with nicotine and flavoring daily.          Objective   Vital Signs:   Vitals:    02/24/25 1524   BP: 112/58   BP Location: Right arm   Patient Position: Sitting   Cuff Size: Adult   Pulse: 86   SpO2: 98%   Weight: 70 kg (154 lb 6.4 oz)  "  Height: 162.6 cm (64.02\")               Physical Exam  Vitals and nursing note reviewed.   Constitutional:       General: She is not in acute distress.     Appearance: Normal appearance. She is not ill-appearing.   HENT:      Head: Normocephalic and atraumatic.   Cardiovascular:      Rate and Rhythm: Normal rate and regular rhythm.      Heart sounds: Normal heart sounds. No murmur heard.  Pulmonary:      Effort: Pulmonary effort is normal. No respiratory distress.      Breath sounds: Normal breath sounds. No wheezing.   Abdominal:      General: Abdomen is flat. Bowel sounds are normal. There is no distension.      Palpations: Abdomen is soft. There is no mass.      Tenderness: There is abdominal tenderness in the epigastric area and left upper quadrant. There is no guarding or rebound. Negative signs include Morejon's sign, Rovsing's sign and McBurney's sign.   Musculoskeletal:      Right lower leg: No edema.      Left lower leg: No edema.   Neurological:      Mental Status: She is alert.   Psychiatric:         Mood and Affect: Mood normal.         Behavior: Behavior normal.          Result Review :     The following data was reviewed by: KRYSTAL Angela on 02/24/2025:    CBC & Differential (06/19/2023 00:00)  Comprehensive Metabolic Panel (06/19/2023 00:00)      ECG 12 Lead    Date/Time: 2/24/2025 5:01 PM  Performed by: Cyndie Sanchez APRN    Authorized by: Cyndie Sanchez APRN  Previous ECG: no previous ECG available  Rhythm: sinus rhythm  Rhythm comments: with Sinus Arrhythmia  Rate: normal  BPM: 72  Clinical impression comment: Borderline ECG            Assessment and Plan    Assessment & Plan  Epigastric pain  Minimize fatty foods, sugar and caffeine.  Avoid Vape use.  Rx:  Omeprazole daily before breakfast..    Orders:    Comprehensive Metabolic Panel    CBC & Differential    Lipase    Amylase    US Abdomen Complete; Future    XR Chest PA & Lateral; Future    ECG 12 Lead    omeprazole (priLOSEC) " 20 MG capsule; Take 1 capsule by mouth Every Morning Before Breakfast.    Acute LUQ pain    Orders:    Comprehensive Metabolic Panel    CBC & Differential    Lipase    Amylase    US Abdomen Complete; Future    Intermittent left-sided chest pain  C/O intermittent left upper chest pain.  Order:  ECG, abnormal  Order:  Holter Monitor  Order:  Chest XR    Orders:    XR Chest PA & Lateral; Future    ECG 12 Lead    Holter Monitor - 72 Hour Up To 15 Days; Future    Vapes nicotine containing substance  She reports vaping nicotine/flavoring daily.  Discussed importance of stopping Vape use.  Order:  Chest XR  Will continue to monitor.    Orders:    XR Chest PA & Lateral; Future    Abnormal ECG  Prior ECG showed Sinus Rhythm with Sinus Arrhythmia.  Order:  Holter Monitor.    Orders:    Holter Monitor - 72 Hour Up To 15 Days; Future         Follow Up   Return in about 2 weeks (around 3/10/2025) for Next scheduled follow up Abd Pain/Chest Pain.  Patient was given instructions and counseling regarding her condition or for health maintenance advice. Please see specific information pulled into the AVS if appropriate.

## 2025-02-26 ENCOUNTER — TELEPHONE (OUTPATIENT)
Dept: FAMILY MEDICINE CLINIC | Facility: CLINIC | Age: 25
End: 2025-02-26
Payer: COMMERCIAL

## 2025-02-26 ENCOUNTER — HOSPITAL ENCOUNTER (OUTPATIENT)
Dept: GENERAL RADIOLOGY | Facility: HOSPITAL | Age: 25
Discharge: HOME | End: 2025-02-26
Payer: COMMERCIAL

## 2025-02-26 ENCOUNTER — LAB (OUTPATIENT)
Dept: LAB | Facility: HOSPITAL | Age: 25
End: 2025-02-26
Payer: COMMERCIAL

## 2025-02-26 DIAGNOSIS — R10.13 EPIGASTRIC PAIN: ICD-10-CM

## 2025-02-26 DIAGNOSIS — R07.89 INTERMITTENT LEFT-SIDED CHEST PAIN: ICD-10-CM

## 2025-02-26 DIAGNOSIS — Z72.0 VAPES NICOTINE CONTAINING SUBSTANCE: ICD-10-CM

## 2025-02-26 LAB
ALBUMIN SERPL-MCNC: 4.4 G/DL (ref 3.5–5.2)
ALBUMIN/GLOB SERPL: 2.2 G/DL
ALP SERPL-CCNC: 36 U/L (ref 39–117)
ALT SERPL W P-5'-P-CCNC: 10 U/L (ref 1–33)
AMYLASE SERPL-CCNC: 46 U/L (ref 28–100)
ANION GAP SERPL CALCULATED.3IONS-SCNC: 12 MMOL/L (ref 5–15)
AST SERPL-CCNC: 11 U/L (ref 1–32)
BASOPHILS # BLD AUTO: 0.02 10*3/MM3 (ref 0–0.2)
BASOPHILS NFR BLD AUTO: 0.4 % (ref 0–1.5)
BILIRUB SERPL-MCNC: 0.4 MG/DL (ref 0–1.2)
BUN SERPL-MCNC: 11 MG/DL (ref 6–20)
BUN/CREAT SERPL: 17.2 (ref 7–25)
CALCIUM SPEC-SCNC: 9.2 MG/DL (ref 8.6–10.5)
CHLORIDE SERPL-SCNC: 106 MMOL/L (ref 98–107)
CO2 SERPL-SCNC: 23 MMOL/L (ref 22–29)
CREAT SERPL-MCNC: 0.64 MG/DL (ref 0.57–1)
DEPRECATED RDW RBC AUTO: 42.1 FL (ref 37–54)
EGFRCR SERPLBLD CKD-EPI 2021: 126.7 ML/MIN/1.73
EOSINOPHIL # BLD AUTO: 0.11 10*3/MM3 (ref 0–0.4)
EOSINOPHIL NFR BLD AUTO: 2.3 % (ref 0.3–6.2)
ERYTHROCYTE [DISTWIDTH] IN BLOOD BY AUTOMATED COUNT: 13 % (ref 12.3–15.4)
GLOBULIN UR ELPH-MCNC: 2 GM/DL
GLUCOSE SERPL-MCNC: 84 MG/DL (ref 65–99)
HCT VFR BLD AUTO: 40.6 % (ref 34–46.6)
HGB BLD-MCNC: 13.4 G/DL (ref 12–15.9)
IMM GRANULOCYTES # BLD AUTO: 0.01 10*3/MM3 (ref 0–0.05)
IMM GRANULOCYTES NFR BLD AUTO: 0.2 % (ref 0–0.5)
LIPASE SERPL-CCNC: 17 U/L (ref 13–60)
LYMPHOCYTES # BLD AUTO: 1.77 10*3/MM3 (ref 0.7–3.1)
LYMPHOCYTES NFR BLD AUTO: 37.7 % (ref 19.6–45.3)
MCH RBC QN AUTO: 29 PG (ref 26.6–33)
MCHC RBC AUTO-ENTMCNC: 33 G/DL (ref 31.5–35.7)
MCV RBC AUTO: 87.9 FL (ref 79–97)
MONOCYTES # BLD AUTO: 0.32 10*3/MM3 (ref 0.1–0.9)
MONOCYTES NFR BLD AUTO: 6.8 % (ref 5–12)
NEUTROPHILS NFR BLD AUTO: 2.46 10*3/MM3 (ref 1.7–7)
NEUTROPHILS NFR BLD AUTO: 52.6 % (ref 42.7–76)
NRBC BLD AUTO-RTO: 0 /100 WBC (ref 0–0.2)
PLATELET # BLD AUTO: 218 10*3/MM3 (ref 140–450)
PMV BLD AUTO: 9.4 FL (ref 6–12)
POTASSIUM SERPL-SCNC: 4.1 MMOL/L (ref 3.5–5.2)
PROT SERPL-MCNC: 6.4 G/DL (ref 6–8.5)
RBC # BLD AUTO: 4.62 10*6/MM3 (ref 3.77–5.28)
SODIUM SERPL-SCNC: 141 MMOL/L (ref 136–145)
WBC NRBC COR # BLD AUTO: 4.69 10*3/MM3 (ref 3.4–10.8)

## 2025-02-26 PROCEDURE — 85025 COMPLETE CBC W/AUTO DIFF WBC: CPT | Performed by: NURSE PRACTITIONER

## 2025-02-26 PROCEDURE — 82150 ASSAY OF AMYLASE: CPT | Performed by: NURSE PRACTITIONER

## 2025-02-26 PROCEDURE — 80053 COMPREHEN METABOLIC PANEL: CPT | Performed by: NURSE PRACTITIONER

## 2025-02-26 PROCEDURE — 83690 ASSAY OF LIPASE: CPT | Performed by: NURSE PRACTITIONER

## 2025-02-26 PROCEDURE — 36415 COLL VENOUS BLD VENIPUNCTURE: CPT | Performed by: NURSE PRACTITIONER

## 2025-02-26 PROCEDURE — 71046 X-RAY EXAM CHEST 2 VIEWS: CPT

## 2025-02-26 NOTE — TELEPHONE ENCOUNTER
[facility] WAS CALLING THE [facility] [place] ABOUT LABS NEEDING TO BE RELEASED BUT THEN THE [facility] CALLED AND STATED PATIENT WAS WAITING FOR LABS TO BE DONE  BUT [facility] CANNOT SEE THEM NOT SURE WHAT NEEDS TO BE DONE

## 2025-02-28 NOTE — TELEPHONE ENCOUNTER
Can you please call patient to find out if this message is pertaining to this patient.  The labs I did have been released and it shows that patient has seen the labs I collected.  Please clarify what is being requested as message is not clear.  There is no caller name or phone number on this message to call.  Thanks

## 2025-03-07 ENCOUNTER — HOSPITAL ENCOUNTER (OUTPATIENT)
Dept: CARDIOLOGY | Facility: HOSPITAL | Age: 25
Discharge: HOME | End: 2025-03-07
Admitting: NURSE PRACTITIONER
Payer: COMMERCIAL

## 2025-03-07 DIAGNOSIS — R94.31 ABNORMAL ECG: ICD-10-CM

## 2025-03-07 DIAGNOSIS — R07.89 INTERMITTENT LEFT-SIDED CHEST PAIN: ICD-10-CM

## 2025-03-07 PROCEDURE — 93242 EXT ECG>48HR<7D RECORDING: CPT

## 2025-03-14 ENCOUNTER — HOSPITAL ENCOUNTER (OUTPATIENT)
Facility: HOSPITAL | Age: 25
Discharge: HOME | End: 2025-03-14
Admitting: NURSE PRACTITIONER
Payer: COMMERCIAL

## 2025-03-14 DIAGNOSIS — R10.13 EPIGASTRIC PAIN: ICD-10-CM

## 2025-03-14 DIAGNOSIS — R10.12 ACUTE LUQ PAIN: ICD-10-CM

## 2025-03-14 LAB
CV ZIO BASELINE AVG BPM: 93 BPM
CV ZIO BASELINE BPM HIGH: 151 BPM
CV ZIO BASELINE BPM LOW: 50 BPM
CV ZIO DEVICE ANALYSIS TIME: NORMAL
CV ZIO ECT SVE COUNT: 31 EPISODES
CV ZIO ECT SVE CPLT COUNT: 0 EPISODES
CV ZIO ECT SVE CPLT FREQ: 0
CV ZIO ECT SVE FREQ: NORMAL
CV ZIO ECT SVE TPLT COUNT: 0 EPISODES
CV ZIO ECT SVE TPLT FREQ: 0
CV ZIO ECT VE COUNT: 10 EPISODES
CV ZIO ECT VE CPLT COUNT: 0 EPISODES
CV ZIO ECT VE CPLT FREQ: 0
CV ZIO ECT VE FREQ: NORMAL
CV ZIO ECT VE TPLT COUNT: 0 EPISODES
CV ZIO ECT VE TPLT FREQ: 0
CV ZIO ECTOPIC SVE COUPLET RAW PERCENT: 0 %
CV ZIO ECTOPIC SVE ISOLATED PERCENT: 0.01 %
CV ZIO ECTOPIC SVE TRIPLET RAW PERCENT: 0 %
CV ZIO ECTOPIC VE COUPLET RAW PERCENT: 0 %
CV ZIO ECTOPIC VE ISOLATED PERCENT: 0 %
CV ZIO ECTOPIC VE TRIPLET RAW PERCENT: 0 %
CV ZIO ENROLLMENT END: NORMAL
CV ZIO ENROLLMENT START: NORMAL
CV ZIO PATIENT EVENTS DIARIES: 5
CV ZIO PATIENT EVENTS TRIGGERS: 6
CV ZIO PAUSE COUNT: 0
CV ZIO PRESCRIPTION STATUS: NORMAL
CV ZIO SVT COUNT: 0
CV ZIO TOTAL  ENROLLMENT PERIOD: NORMAL
CV ZIO VT COUNT: 0

## 2025-03-14 PROCEDURE — 76700 US EXAM ABDOM COMPLETE: CPT

## 2025-03-19 ENCOUNTER — OFFICE VISIT (OUTPATIENT)
Dept: FAMILY MEDICINE CLINIC | Facility: CLINIC | Age: 25
End: 2025-03-19
Payer: COMMERCIAL

## 2025-03-19 DIAGNOSIS — Z78.9 ALCOHOL USE: Chronic | ICD-10-CM

## 2025-03-19 DIAGNOSIS — R06.01 ORTHOPNEA: ICD-10-CM

## 2025-03-19 DIAGNOSIS — R10.13 EPIGASTRIC ABDOMINAL PAIN: Primary | ICD-10-CM

## 2025-03-19 PROCEDURE — 99214 OFFICE O/P EST MOD 30 MIN: CPT | Performed by: NURSE PRACTITIONER

## 2025-03-19 NOTE — PROGRESS NOTES
"Chief Complaint  Abdominal Pain and Chest Pain    Subjective        HPI     Nataliia Rick presents to [facility] for follow-up on LUQ/Epigastric pain:      LUQ/Epigastric pain - she has not picked up the Omeprazole from pharmacy.   She denies having abdominal/epigastric pain today.      Chest pain - she reports and points to having occasional epigastric pain (pointed to bottom of sternum).  Pain feels like dull aching pain.  Pain level today 3/10 and pain is intermittent pain.   She denies having epigastric pain in office today. She has not noticed if her pain is before or after eating.  She reports that her pain comes on randomly.  She denies having nausea, vomiting, fever or chills, radiating pain to back, pain/numbness down arms with chest pain.    Orthopnea - She reports when about to fall asleep on back, her breath just goes away for a little bit and has to gasp to get it back.  She reports this has been going on for a couple weeks.  She denies this SOB being related to her epigastric pain.  She reports has stopped vaping 3 weeks ago.  Prior to 3 weeks ago she was vaping daily all the time.  She was using nicotine in the vape.    ETOH - she reports drinking about 5-20 drinks per week.  She reports the amount she is drinking is different every week.   She works at [employer] as a .          Objective   Vital Signs:   Vitals:    03/19/25 1320 03/19/25 1328   BP:  100/80   BP Location:  Right arm   Patient Position:  Sitting   Cuff Size:  Adult   Pulse:  88   Resp:  16   SpO2: 98% 98%   Weight: 65.8 kg (145 lb)    Height: 162.6 cm (64.02\")                  Physical Exam  Vitals and nursing note reviewed.   Constitutional:       General: She is not in acute distress.     Appearance: Normal appearance. She is not ill-appearing.   HENT:      Head: Normocephalic and atraumatic.   Cardiovascular:      Rate and Rhythm: Normal rate and regular rhythm.      Heart sounds: Normal " heart sounds. No murmur heard.  Pulmonary:      Effort: Pulmonary effort is normal. No respiratory distress.      Breath sounds: Normal breath sounds. No wheezing.   Abdominal:      General: Abdomen is flat. Bowel sounds are normal. There is no distension.      Palpations: Abdomen is soft. There is no mass.      Tenderness: There is no abdominal tenderness. There is no guarding or rebound.   Musculoskeletal:      Right lower leg: No edema.      Left lower leg: No edema.   Neurological:      Mental Status: She is alert.          Result Review :     The following data was reviewed by: KRYSTAL Angela on 03/19/2025:      Comprehensive Metabolic Panel (02/26/2025 13:21)  CBC & Differential (02/26/2025 13:21)  Lipase (02/26/2025 13:21)  Amylase (02/26/2025 13:21)  H. Pylori Breath Test - Breath, Lung (03/19/2025 14:10)     Assessment and Plan    Assessment & Plan  Epigastric abdominal pain  C/O intermittent epigastric pain.  Abdominal US dated 3/14/25 is unremarkable.  Lab:  H.Pylori Breath Test  Will call with result and treatment plan.  Encouraged to  Omeprazole and to start taking medication daily.  Referral to GI for eval/treat.    Orders:    H. Pylori Breath Test - Breath, Lung    Ambulatory Referral to Gastroenterology    Orthopnea  C/O when about to fall asleep on back, breath just goes away for a little bit and has to gasp to get it back.  Chest XR dated 2/26/25 is normal.  Referral to Pulmonology for eval/treat.    Orders:    Ambulatory Referral to Pulmonology    Alcohol use  Patient drinks about 5-20 drinks per week.  Discussed importance of minimizing ETOH intake.  Will continue to monitor.              Follow Up   Return in about 3 months (around 6/19/2025) for Next scheduled follow up - Epigastric Pain/Orthopnea/ETOH use.  Patient was given instructions and counseling regarding her condition or for health maintenance advice. Please see specific information pulled into the AVS if appropriate.

## 2025-03-20 LAB — UREA BREATH TEST QL: NEGATIVE

## 2025-03-26 VITALS
WEIGHT: 145 LBS | RESPIRATION RATE: 16 BRPM | OXYGEN SATURATION: 98 % | DIASTOLIC BLOOD PRESSURE: 80 MMHG | SYSTOLIC BLOOD PRESSURE: 100 MMHG | HEART RATE: 88 BPM | HEIGHT: 64 IN | BODY MASS INDEX: 24.75 KG/M2

## 2025-03-26 PROBLEM — R06.01 ORTHOPNEA: Status: ACTIVE | Noted: 2025-03-26

## 2025-03-27 NOTE — ASSESSMENT & PLAN NOTE
C/O when about to fall asleep on back, breath just goes away for a little bit and has to gasp to get it back.  Chest XR dated 2/26/25 is normal.  Referral to Pulmonology for eval/treat.    Orders:    Ambulatory Referral to Pulmonology     Fax of BRIONNA [email or web address] from [employer] for the Rx Lyrica. Patient last seen 8/22/2018    Message to Dr Sethi to review and advise patient     Fax received from pharmacy that the pregabalin (LYRICA) 50 MG has been denied. Please advise.    no

## 2025-04-03 ENCOUNTER — TELEPHONE (OUTPATIENT)
Dept: FAMILY MEDICINE CLINIC | Facility: CLINIC | Age: 25
End: 2025-04-03
Payer: COMMERCIAL

## 2025-05-16 ENCOUNTER — OFFICE VISIT (OUTPATIENT)
Dept: FAMILY MEDICINE CLINIC | Facility: CLINIC | Age: 25
End: 2025-05-16
Payer: COMMERCIAL

## 2025-05-16 VITALS
TEMPERATURE: 97.9 F | DIASTOLIC BLOOD PRESSURE: 68 MMHG | HEART RATE: 77 BPM | OXYGEN SATURATION: 100 % | WEIGHT: 143.13 LBS | SYSTOLIC BLOOD PRESSURE: 108 MMHG | HEIGHT: 64 IN | BODY MASS INDEX: 24.43 KG/M2

## 2025-05-16 DIAGNOSIS — T16.2XXA FOREIGN BODY OF LEFT EAR, INITIAL ENCOUNTER: ICD-10-CM

## 2025-05-16 DIAGNOSIS — S46.812A TRAPEZIUS MUSCLE STRAIN, LEFT, INITIAL ENCOUNTER: ICD-10-CM

## 2025-05-16 DIAGNOSIS — H65.03 NON-RECURRENT ACUTE SEROUS OTITIS MEDIA OF BOTH EARS: Primary | ICD-10-CM

## 2025-05-16 RX ORDER — IBUPROFEN 600 MG/1
600 TABLET, FILM COATED ORAL
COMMUNITY
Start: 2025-03-22

## 2025-05-16 RX ORDER — METOPROLOL TARTRATE 50 MG
TABLET ORAL
COMMUNITY
Start: 2025-04-22

## 2025-05-16 NOTE — PROGRESS NOTES
"Chief Complaint  Earache (Left ear)    Subjective        HPI     Nataliia Rick presents to [facility] with complaint of left ear popping sound:    History of Present Illness  She has been experiencing intermittent left ear popping sound for approximately 1 week, which she attributes to a recent episode of allergies. The onset of the popping sound coincided with the allergy symptoms, which included sneezing and a runny nose, but no fever. The pain is described as a popping sensation upon waking, accompanied by a feeling of fluid in the ear. It is tender to touch around the front and back of the ear, but there is no throbbing. The popping sensation subsides after waking and does not recur throughout the day. There is no reported ear drainage or muffled hearing.    Additionally, she reports experiencing pain radiating down her neck, left shoulder, and left arm over the past few nights. She is uncertain if these symptoms are related to her ear pain or possibly due to a pulled muscle. She has not engaged in any physical activities such as exercise or pickleball that could have resulted in a muscle strain.          Objective   Vital Signs:   Vitals:    05/16/25 1302   BP: 108/68   BP Location: Right arm   Patient Position: Sitting   Cuff Size: Adult   Pulse: 77   Temp: 97.9 °F (36.6 °C)   TempSrc: Oral   SpO2: 100%   Weight: 64.9 kg (143 lb 2 oz)   Height: 162.6 cm (64.02\")   PainSc: 2    PainLoc: Ear        BMI is within normal parameters. No other follow-up for BMI required.        Physical Exam  Vitals and nursing note reviewed.   Constitutional:       General: She is not in acute distress.     Appearance: Normal appearance. She is not ill-appearing.   HENT:      Head: Normocephalic and atraumatic.      Right Ear: Ear canal and external ear normal. A middle ear effusion is present. There is no impacted cerumen.      Left Ear: External ear normal. A middle ear effusion is present. There is no " "impacted cerumen. A foreign body (Strand of hair in left ear canal along TM.) is present.      Nose: Nose normal. No congestion or rhinorrhea.      Mouth/Throat:      Mouth: Mucous membranes are moist.      Pharynx: No oropharyngeal exudate or posterior oropharyngeal erythema.   Eyes:      General:         Right eye: No discharge.         Left eye: No discharge.      Conjunctiva/sclera: Conjunctivae normal.   Cardiovascular:      Rate and Rhythm: Normal rate and regular rhythm.      Heart sounds: Normal heart sounds. No murmur heard.  Pulmonary:      Effort: Pulmonary effort is normal. No respiratory distress.      Breath sounds: Normal breath sounds. No wheezing.   Musculoskeletal:      Left shoulder: No swelling, deformity, effusion, laceration, tenderness, bony tenderness or crepitus. Normal range of motion. Normal strength.      Cervical back: Normal range of motion and neck supple. No edema or tenderness. No pain with movement or muscular tenderness. Normal range of motion.   Lymphadenopathy:      Cervical: No cervical adenopathy.   Neurological:      Mental Status: She is alert.   Psychiatric:         Mood and Affect: Mood normal.          Result Review :                 Assessment and Plan    Assessment & Plan  Non-recurrent acute serous otitis media of both ears  Apply warm compress over bilateral ears.  Yawn or chew to pop your ears.  Gargle w/warm salt water.  Take (OTC) Sudafed as directed on package insert.  Follow-up if si/sy worsens or fails to improve.        Foreign body of left ear, initial encounter  Recommended she see [facility] off [facility] on \"walk in Wednesday\" for evaluation of hair removal from left ear and patient agreed.         Trapezius muscle strain, left, initial encounter  Rest.  Gentle stretching of neck muscles.  Alternate Ice/Heat 20 mins on, 40 mins off.  Take (OTC) Ibuprofen as directed on bottle PRN pain.  Follow-up if si/sy worsen or fail to improve.              Follow Up "   Return in about 6 months (around 11/16/2025) for Annual physical.  Patient was given instructions and counseling regarding her condition or for health maintenance advice. Please see specific information pulled into the AVS if appropriate.   Patient or patient representative verbalized consent for the use of Ambient Listening during the visit with  KRYSTAL Angela for chart documentation. 5/29/2025  13:36 EDT

## 2025-07-29 ENCOUNTER — OFFICE VISIT (OUTPATIENT)
Dept: FAMILY MEDICINE CLINIC | Facility: CLINIC | Age: 25
End: 2025-07-29
Payer: COMMERCIAL

## 2025-07-29 VITALS
OXYGEN SATURATION: 98 % | SYSTOLIC BLOOD PRESSURE: 100 MMHG | DIASTOLIC BLOOD PRESSURE: 60 MMHG | HEIGHT: 65 IN | BODY MASS INDEX: 24.62 KG/M2 | WEIGHT: 147.8 LBS | TEMPERATURE: 98.2 F | HEART RATE: 78 BPM

## 2025-07-29 DIAGNOSIS — R55 NEAR SYNCOPE: ICD-10-CM

## 2025-07-29 DIAGNOSIS — F41.0 PANIC ATTACKS: Primary | ICD-10-CM

## 2025-07-29 DIAGNOSIS — R06.02 SOB (SHORTNESS OF BREATH): ICD-10-CM

## 2025-07-29 DIAGNOSIS — R07.9 CHEST PAIN, UNSPECIFIED TYPE: ICD-10-CM

## 2025-07-29 DIAGNOSIS — F32.A ANXIETY AND DEPRESSION: ICD-10-CM

## 2025-07-29 DIAGNOSIS — F10.10 ALCOHOL ABUSE: ICD-10-CM

## 2025-07-29 DIAGNOSIS — F41.9 ANXIETY AND DEPRESSION: ICD-10-CM

## 2025-07-29 PROCEDURE — 99214 OFFICE O/P EST MOD 30 MIN: CPT | Performed by: NURSE PRACTITIONER

## 2025-07-29 RX ORDER — PANTOPRAZOLE SODIUM 40 MG/1
40 TABLET, DELAYED RELEASE ORAL DAILY
COMMUNITY
Start: 2025-06-29

## 2025-07-29 RX ORDER — HYDROXYZINE HYDROCHLORIDE 50 MG/1
50 TABLET, FILM COATED ORAL EVERY 8 HOURS PRN
Qty: 90 TABLET | Refills: 0 | Status: SHIPPED | OUTPATIENT
Start: 2025-07-29

## 2025-07-29 NOTE — PROGRESS NOTES
Chief Complaint  Chest Pain, Shortness of Breath, and Syncope    Subjective        HPI     Nataliia Rick presents to [facility] for Chest Pain, SOB, near syncope:    History of Present Illness    Chest Pain/Anxiety - She continues to experience chest pain, which she believes may be related to her anxiety. The chest pain is not constant but occurs almost daily, particularly before her menstrual period. She has undergone a CT scan of the heart and an endoscopy, which revealed a high eosinophil count. Her gastroenterologist suggested that this could be due to reflux or an autoimmune condition like EOE, which her father has. She was prescribed Protonix, which she has been taking daily for nearly a month, but her symptoms persist.    Near Syncope/SOB - She also reports episodes of breathlessness and near-fainting, although she has not actually fainted. She experiences random shortness of breath, which is not associated with her chest pain. She has not previously consulted an asthma or allergy specialist.    Anxiety/SOB/Near Syncope - She has a history of anxiety, which she feels has worsened recently. She does not believe her anxiety is causing her chest pain, but thinks it might be contributing to her feelings of breathlessness and impending fainting. She has been on Zoloft for 4 to 5 years, which was effective but caused side effects such as fatigue and weight gain. She is currently seeking therapy and is considering medication for her panic attacks. She was previously prescribed Hydroxyzine for panic attacks, but did not receive the prescription. She was also on Venlafaxine for 1 to 2 years. Her anxiety intensifies 10 days before her menstrual period. She has been experiencing panic attacks for the past 4 to 5 months, during which she hyperventilates and cries so hard that she can not breathe. Over the past 14 days, she has felt down, depressed, and hopeless for 7 to 8 days; had little  "interest in activities for 1 to 2 days; had trouble sleeping every day; felt tired or had little energy every day; felt bad about herself, felt like a failure, and felt like she let herself or her family down for 7 to 8 days; had trouble concentrating for 7 to 8 days; had thoughts of being better off dead or hurting herself for 3 days; found it somewhat difficult to take care of things at home or get along with other people for 7 to 8 days; felt nervous, anxious, and on edge every day; was unable to stop or control worrying every day; worried too much about different things every day; had trouble relaxing and was so restless it was hard to sit still every day; became easily annoyed or irritable every day; felt afraid as if something bad might happen every day. She does not feel like harming herself or others today. She is interested in further testing with behavioral health and is looking for a therapist. She was diagnosed with depression disorder at age 14 or 15, which included anxiety. She is unsure if a childhood event might be contributing to her current feelings.    Occupations:   Alcohol: She consumes 7 to 15 drinks per week, mostly wine.  Sleep: She has trouble sleeping every day.    GYNECOLOGICAL HISTORY:  - Menstrual Pain: She experiences increased anxiety 10 days before her menstrual period.    FAMILY HISTORY  Her father has an autoimmune condition called Eosinophilic Esophagitis (EOE).          Objective   Vital Signs:   Vitals:    07/29/25 1153   BP: 100/60   BP Location: Left arm   Patient Position: Sitting   Cuff Size: Adult   Pulse: 78   Temp: 98.2 °F (36.8 °C)   TempSrc: Oral   SpO2: 98%   Weight: 67 kg (147 lb 12.8 oz)   Height: 165.1 cm (65\")        BMI is within normal parameters. No other follow-up for BMI required.        Physical Exam  Vitals and nursing note reviewed.   Constitutional:       General: She is not in acute distress.     Appearance: Normal appearance. She is not " ill-appearing, toxic-appearing or diaphoretic.   HENT:      Head: Normocephalic and atraumatic.   Eyes:      Conjunctiva/sclera: Conjunctivae normal.   Pulmonary:      Effort: Pulmonary effort is normal. No respiratory distress.   Neurological:      Mental Status: She is alert.   Psychiatric:         Attention and Perception: Attention and perception normal.         Mood and Affect: Mood is anxious. Affect is flat.         Speech: Speech normal.         Behavior: Behavior normal.         Thought Content: Thought content normal. Thought content does not include homicidal or suicidal ideation. Thought content does not include homicidal or suicidal plan.         Cognition and Memory: Cognition is not impaired.          Result Review :     The following data was reviewed by: KRYSTAL Angela on 07/29/2025:      XR Chest 1 View (04/24/2025 22:06)  [facility] CTA Heart Over Read (05/21/2025 12:28)  CT Angiogram Heart W/3D Image (05/21/2025 12:28)  COMPREHENSIVE METABOLIC PANEL (04/24/2025 21:15)  CBC AND DIFFERENTIAL (04/24/2025 21:15)     Assessment and Plan    Assessment & Plan  Panic attacks  Denies SI/HI.  Discussed National Suicide Hotline 988 available 24/7.  Increase activity daily.  Start Hydroxyzine 50mg daily PRN  for Anxiety/Panic Attacks. Discussed SE of medication.  Advised to initiate treatment at night to make sure she can tolerated it and she agreed.  Discussed medication can cause drowsiness and not to drive, operate heavy machinery, or drink ETOH while taking this medication and she agreed.  Referral to [facility] for evaluation and treatment.  Follow-up to re-assess anxiety/depression in 2 weeks.    Orders:    Ambulatory Referral to Behavioral Health    hydrOXYzine (ATARAX) 50 MG tablet; Take 1 tablet by mouth Every 8 (Eight) Hours As Needed for Anxiety. DO NOT DRIVE/OPERATE HEAVY MACHINERY WHILE TAKING THIS MEDICATION.    Anxiety and depression  JOSE 7 Total Score: 20; Severe  Anxiety.  PHQ-9 Total Score: 14; Moderate Depression.  She does not believe her anxiety is causing her chest pain, but thinks it might be contributing to her feelings of breathlessness and impending fainting.  Marco Antonio SI/HI; Discussed [facility] #[id number]  Take Hydroxyzine TID PRN for anxiety/depression/panic attacks.  Follow-up in 2 weeks for re-assessment.    Orders:    Ambulatory Referral to Behavioral Health    hydrOXYzine (ATARAX) 50 MG tablet; Take 1 tablet by mouth Every 8 (Eight) Hours As Needed for Anxiety. DO NOT DRIVE/OPERATE HEAVY MACHINERY WHILE TAKING THIS MEDICATION.    SOB (shortness of breath)  She does not believe her anxiety is causing her chest pain, but thinks it might be contributing to her feelings of breathlessness and impending fainting.   Continue current treatment plan.  Referral to Asthma & Allergy for eval/treat.  Will continue to monitor.    Orders:    Ambulatory Referral to Immunology    Chest pain, unspecified type  She continues to experience chest pain, which she believes may be related to her anxiety.  Continue current treatment plan.  Will continue to monitor.       Near syncope  She does not believe her anxiety is causing her chest pain, but thinks it might be contributing to her feelings of breathlessness and impending fainting.  Increase activity daily to help improve anxiety.  CTA Heart dated 5/21/25 - WNL  CTA Heart dated 5/21/25 - WNL  Chest XR dated 4/24/25 - WNL  Take Hydroxyzine as prescribed.  Will continue to monitor.         Alcohol abuse  Patient drinks ETOH approx 7-14 drinks per day.  Encouraged to cut back on ETOH use.  Will continue to monitor.    Orders:    Ambulatory Referral to Behavioral Health       I spent 30 minutes caring for Nataliia on this date of service. This time includes time spent by me in the following activities:preparing for the visit, reviewing tests, obtaining and/or reviewing a separately obtained history, performing a medically appropriate examination and/or  evaluation , counseling and educating the patient/family/caregiver, ordering medications, tests, or procedures, referring and communicating with other health care professionals , documenting information in the medical record, and independently interpreting results and communicating that information with the patient/family/caregiver  Follow Up   Return in about 2 weeks (around 8/12/2025) for Next scheduled follow up - Anxiety, Depression, Panic Attacks.  Patient was given instructions and counseling regarding her condition or for health maintenance advice. Please see specific information pulled into the AVS if appropriate.   Patient or patient representative verbalized consent for the use of Ambient Listening during the visit with  KRYSTAL Angela for chart documentation. 7/29/2025  12:35 EDT